# Patient Record
Sex: FEMALE | URBAN - METROPOLITAN AREA
[De-identification: names, ages, dates, MRNs, and addresses within clinical notes are randomized per-mention and may not be internally consistent; named-entity substitution may affect disease eponyms.]

---

## 2020-11-06 ENCOUNTER — APPOINTMENT (RX ONLY)
Dept: URBAN - METROPOLITAN AREA CLINIC 374 | Facility: CLINIC | Age: 40
Setting detail: DERMATOLOGY
End: 2020-11-06

## 2020-11-06 DIAGNOSIS — L93.0 DISCOID LUPUS ERYTHEMATOSUS: ICD-10-CM

## 2020-11-06 PROBLEM — L30.9 DERMATITIS, UNSPECIFIED: Status: ACTIVE | Noted: 2020-11-06

## 2020-11-06 PROCEDURE — ? PRESCRIPTION MEDICATION MANAGEMENT

## 2020-11-06 PROCEDURE — ? PRESCRIPTION

## 2020-11-06 PROCEDURE — ? MEDICATION COUNSELING

## 2020-11-06 PROCEDURE — ? PHOTO-DOCUMENTATION

## 2020-11-06 PROCEDURE — ? BIOPSY BY SHAVE METHOD

## 2020-11-06 PROCEDURE — 11102 TANGNTL BX SKIN SINGLE LES: CPT

## 2020-11-06 RX ORDER — TRIAMCINOLONE ACETONIDE 1 MG/G
1 OINTMENT TOPICAL BID
Qty: 1 | Refills: 2 | Status: ERX | COMMUNITY
Start: 2020-11-06

## 2020-11-06 RX ADMIN — TRIAMCINOLONE ACETONIDE 1: 1 OINTMENT TOPICAL at 00:00

## 2020-11-06 ASSESSMENT — LOCATION SIMPLE DESCRIPTION DERM: LOCATION SIMPLE: LEFT FOREHEAD

## 2020-11-06 ASSESSMENT — LOCATION ZONE DERM: LOCATION ZONE: FACE

## 2020-11-06 ASSESSMENT — LOCATION DETAILED DESCRIPTION DERM: LOCATION DETAILED: LEFT INFERIOR MEDIAL FOREHEAD

## 2020-11-06 NOTE — PROCEDURE: MEDICATION COUNSELING
No Propranolol Pregnancy And Lactation Text: This medication is Pregnancy Category C and it isn't known if it is safe during pregnancy. It is excreted in breast milk.

## 2020-11-06 NOTE — PROCEDURE: MEDICATION COUNSELING
Reason for Disposition   Small spot, skin growth or mole  Lorie Farris is uncertain what it is    Answer Assessment - Initial Assessment Questions  1. APPEARANCE of LESION: \"What does it look like? \"       Yuliana Benisaura spots on right breast. Feels slightly raised  2. SIZE: \"How big is it? \" (e.g., compare to size of pinhead, tip of pen, eraser, coin, pea, grape, ping pong ball; or size in cms or inches)       Some small some bigger  3. COLOR: \"What color is it? \" \"Is there more than one color? \"   brown in color  4. SHAPE: \"What shape is it? \" (e.g., round, irregular)   round  5. RAISED: \"Does it stick up above the skin or is it flat? \" (e.g., raised or elevated)     Slightly raised  6. TENDER: \"Does it hurt when you touch it? \"  (Scale 1-10; or mild, moderate, severe)    No pain  7. LOCATION: \"Where is it located? \"       Right breast  8. ONSET: \"When did it first appear? \"   Started in May  9. NUMBER: \"Is there just one? \" or \"Are there others? \"      about 7  10. CAUSE: \"What do you think it is? \"      unsure  11. OTHER SYMPTOMS: \"Do you have any other symptoms? \" (e.g., fever)        12. PREGNANCY: \"Is there any chance you are pregnant? \" \"When was your last menstrual period? \"    Protocols used: BREAST SYMPTOMS-ADULT-[place], SKIN LESION - MOLES OR GROWTHS-ADULT-[place]    Pt calling with small, brown spots on her right breast. First noticed back in may and now has 7 spots. Some are slightly raised. No pain. Recommend pt call PCP and is seen within 2 weeks, call back if any new or worsening symptoms. Odomzo Counseling- I discussed with the patient the risks of Odomzo including but not limited to nausea, vomiting, diarrhea, constipation, weight loss, changes in the sense of taste, decreased appetite, muscle spasms, and hair loss.  The patient verbalized understanding of the proper use and possible adverse effects of Odomzo.  All of the patient's questions and concerns were addressed.

## 2020-11-06 NOTE — PROCEDURE: MEDICATION COUNSELING
Xeljamesz Pregnancy And Lactation Text: This medication is Pregnancy Category D and is not considered safe during pregnancy.  The risk during breast feeding is also uncertain.

## 2020-11-06 NOTE — PROCEDURE: PRESCRIPTION MEDICATION MANAGEMENT
Detail Level: Zone
Render In Strict Bullet Format?: No
Initiate Treatment: triamcinolone acetonide 0.1 % topical ointment Bid: Apply to spot on head bid

## 2020-11-06 NOTE — PROCEDURE: MEDICATION COUNSELING
Detail Level: Detailed X Size Of Lesion In Cm (Optional): 0 Carac Pregnancy And Lactation Text: This medication is Pregnancy Category X and contraindicated in pregnancy and in women who may become pregnant. It is unknown if this medication is excreted in breast milk.

## 2023-01-01 NOTE — PROCEDURE: MEDICATION COUNSELING
0 Gabapentin Counseling: I discussed with the patient the risks of gabapentin including but not limited to dizziness, somnolence, fatigue and ataxia.

## 2023-02-28 NOTE — HPI: SCAR
How Severe Is Your Scar?: moderate
Is This A New Presentation, Or A Follow-Up?: Scar Follow Up
High Dose Vitamin A Counseling: Side effects reviewed, pt to contact office should one occur.

## 2024-05-19 NOTE — PROCEDURE: MEDICATION COUNSELING
Problem: Safety - Adult  Goal: Free from fall injury  5/19/2024 1037 by Charlotte Rose RN  Outcome: Progressing  Flowsheets (Taken 5/19/2024 0842)  Free From Fall Injury: Instruct family/caregiver on patient safety  5/19/2024 0054 by Shana Hunter RN  Outcome: Progressing  Flowsheets (Taken 5/18/2024 0759 by Charlotte Rose, RN)  Free From Fall Injury: Instruct family/caregiver on patient safety     Problem: Pain  Goal: Verbalizes/displays adequate comfort level or baseline comfort level  5/19/2024 1037 by Charlotte Rose RN  Outcome: Progressing  Flowsheets (Taken 5/19/2024 0730)  Verbalizes/displays adequate comfort level or baseline comfort level:   Encourage patient to monitor pain and request assistance   Assess pain using appropriate pain scale  5/19/2024 0054 by Shana Hunter RN  Outcome: Progressing  Flowsheets (Taken 5/19/2024 0054)  Verbalizes/displays adequate comfort level or baseline comfort level:   Encourage patient to monitor pain and request assistance   Assess pain using appropriate pain scale   Administer analgesics based on type and severity of pain and evaluate response   Implement non-pharmacological measures as appropriate and evaluate response     Problem: Chronic Conditions and Co-morbidities  Goal: Patient's chronic conditions and co-morbidity symptoms are monitored and maintained or improved  5/19/2024 1037 by Charlotte Rose RN  Outcome: Progressing  5/19/2024 0054 by Shana Hunter RN  Outcome: Progressing  Flowsheets (Taken 5/18/2024 2015)  Care Plan - Patient's Chronic Conditions and Co-Morbidity Symptoms are Monitored and Maintained or Improved: Monitor and assess patient's chronic conditions and comorbid symptoms for stability, deterioration, or improvement      Taltz Counseling: I discussed with the patient the risks of ixekizumab including but not limited to immunosuppression, serious infections, worsening of inflammatory bowel disease and drug reactions.  The patient understands that monitoring is required including a PPD at baseline and must alert us or the primary physician if symptoms of infection or other concerning signs are noted.